# Patient Record
Sex: MALE | NOT HISPANIC OR LATINO | URBAN - METROPOLITAN AREA
[De-identification: names, ages, dates, MRNs, and addresses within clinical notes are randomized per-mention and may not be internally consistent; named-entity substitution may affect disease eponyms.]

---

## 2017-09-30 ENCOUNTER — EMERGENCY (EMERGENCY)
Facility: HOSPITAL | Age: 47
LOS: 1 days | Discharge: HOME | End: 2017-09-30
Attending: EMERGENCY MEDICINE | Admitting: EMERGENCY MEDICINE
Payer: COMMERCIAL

## 2017-09-30 VITALS
TEMPERATURE: 97 F | OXYGEN SATURATION: 100 % | HEART RATE: 100 BPM | DIASTOLIC BLOOD PRESSURE: 77 MMHG | SYSTOLIC BLOOD PRESSURE: 118 MMHG | HEIGHT: 72 IN | WEIGHT: 184.97 LBS | RESPIRATION RATE: 17 BRPM

## 2017-09-30 DIAGNOSIS — M41.9 SCOLIOSIS, UNSPECIFIED: ICD-10-CM

## 2017-09-30 DIAGNOSIS — M54.5 LOW BACK PAIN: ICD-10-CM

## 2017-09-30 DIAGNOSIS — Z90.89 ACQUIRED ABSENCE OF OTHER ORGANS: Chronic | ICD-10-CM

## 2017-09-30 DIAGNOSIS — Z79.1 LONG TERM (CURRENT) USE OF NON-STEROIDAL ANTI-INFLAMMATORIES (NSAID): ICD-10-CM

## 2017-09-30 DIAGNOSIS — M51.36 OTHER INTERVERTEBRAL DISC DEGENERATION, LUMBAR REGION: ICD-10-CM

## 2017-09-30 LAB
APPEARANCE UR: CLEAR — SIGNIFICANT CHANGE UP
BILIRUB UR-MCNC: NEGATIVE — SIGNIFICANT CHANGE UP
COLOR SPEC: YELLOW — SIGNIFICANT CHANGE UP
DIFF PNL FLD: NEGATIVE — SIGNIFICANT CHANGE UP
GLUCOSE UR QL: NEGATIVE — SIGNIFICANT CHANGE UP
KETONES UR-MCNC: NEGATIVE — SIGNIFICANT CHANGE UP
LEUKOCYTE ESTERASE UR-ACNC: NEGATIVE — SIGNIFICANT CHANGE UP
NITRITE UR-MCNC: NEGATIVE — SIGNIFICANT CHANGE UP
PH UR: 7 — SIGNIFICANT CHANGE UP (ref 5–8)
PROT UR-MCNC: NEGATIVE — SIGNIFICANT CHANGE UP
SP GR SPEC: 1.01 — SIGNIFICANT CHANGE UP (ref 1.01–1.02)
UROBILINOGEN FLD QL: NEGATIVE — SIGNIFICANT CHANGE UP

## 2017-09-30 PROCEDURE — 99284 EMERGENCY DEPT VISIT MOD MDM: CPT

## 2017-09-30 PROCEDURE — 72110 X-RAY EXAM L-2 SPINE 4/>VWS: CPT

## 2017-09-30 PROCEDURE — 81003 URINALYSIS AUTO W/O SCOPE: CPT

## 2017-09-30 PROCEDURE — 72110 X-RAY EXAM L-2 SPINE 4/>VWS: CPT | Mod: 26

## 2017-09-30 RX ORDER — CYCLOBENZAPRINE HYDROCHLORIDE 10 MG/1
1 TABLET, FILM COATED ORAL
Qty: 30 | Refills: 0 | OUTPATIENT
Start: 2017-09-30

## 2017-09-30 RX ORDER — CEFDINIR 250 MG/5ML
1 POWDER, FOR SUSPENSION ORAL
Qty: 20 | Refills: 0 | OUTPATIENT
Start: 2017-09-30 | End: 2017-10-10

## 2017-09-30 RX ORDER — DEXAMETHASONE 0.5 MG/5ML
6 ELIXIR ORAL ONCE
Qty: 0 | Refills: 0 | Status: COMPLETED | OUTPATIENT
Start: 2017-09-30 | End: 2017-09-30

## 2017-09-30 RX ORDER — KETOROLAC TROMETHAMINE 30 MG/ML
60 SYRINGE (ML) INJECTION ONCE
Qty: 0 | Refills: 0 | Status: DISCONTINUED | OUTPATIENT
Start: 2017-09-30 | End: 2017-09-30

## 2017-09-30 RX ORDER — DIAZEPAM 5 MG
5 TABLET ORAL ONCE
Qty: 0 | Refills: 0 | Status: DISCONTINUED | OUTPATIENT
Start: 2017-09-30 | End: 2017-09-30

## 2017-09-30 RX ORDER — CYCLOBENZAPRINE HYDROCHLORIDE 10 MG/1
0 TABLET, FILM COATED ORAL
Qty: 0 | Refills: 0 | COMMUNITY

## 2017-09-30 RX ADMIN — Medication 4 MILLIGRAM(S): at 13:13

## 2017-09-30 RX ADMIN — Medication 5 MILLIGRAM(S): at 13:12

## 2017-09-30 RX ADMIN — Medication 60 MILLIGRAM(S): at 13:13

## 2017-09-30 NOTE — ED PROVIDER NOTE - PROGRESS NOTE DETAILS
pt was fast asleep  Reevaluated patient at bedside.  Patient feeling much improved.  Discussed the results of all diagnostic testing in ED and copies of all reports given.   An opportunity to ask questions was given.  Discussed the importance of prompt, close medical follow-up.  Patient will return with any changes, concerns or persistent / worsening symptoms.  Understanding of all instructions verbalized.

## 2017-09-30 NOTE — ED PROVIDER NOTE - MEDICAL DECISION MAKING DETAILS
worsengin low back pain in pt with [place] hnp lumbarspine scolioisis ro acute hnp manage pain ro occult fx given mechanism refer to ortho and pain mgt worsening low back pain in pt with [place] hnp lumbarspine scolioisis ro acute hnp manage pain ro occult fx given mechanism refer to ortho and pain mgt pt cant take too many steroids may worsen a previous eye problem, no rx for medrol

## 2017-09-30 NOTE — ED ADULT NURSE NOTE - OBJECTIVE STATEMENT
patient came in ED with c/o lower back pain worst with sitting or lying down X a week. pt was s/e at Urgent Care, naproxen and flexeril prescribed no relief noted.

## 2017-09-30 NOTE — ED PROVIDER NOTE - CHPI ED SYMPTOMS NEG
no fatigue/no difficulty bearing weight/no constipation/no numbness/no anorexia/no motor function loss/no neck tenderness/no tingling/no bowel dysfunction/no bladder dysfunction

## 2017-09-30 NOTE — ED PROVIDER NOTE - MUSCULOSKELETAL, MLM
Spine appears sciolotic with mod curve lower spine, pain r paraspinal pain pos r slr no motor weakness numbness normal perineum sensation. range of motion is not limited, no muscle or joint tenderness

## 2017-09-30 NOTE — ED PROVIDER NOTE - CONSTITUTIONAL, MLM
normal... Well appearing, well nourished, awake, alert, oriented to person, place, time/situation and in no apparent distress. unless he tries to lay back and has pain

## 2017-09-30 NOTE — ED ADULT NURSE NOTE - CHPI ED SYMPTOMS NEG
no neck tenderness/no bowel dysfunction/no motor function loss/no fatigue/no bladder dysfunction/no difficulty bearing weight/no tingling/no anorexia/no numbness

## 2017-10-04 PROBLEM — Z00.00 ENCOUNTER FOR PREVENTIVE HEALTH EXAMINATION: Status: ACTIVE | Noted: 2017-10-04

## 2017-10-06 ENCOUNTER — APPOINTMENT (OUTPATIENT)
Dept: ORTHOPEDIC SURGERY | Facility: CLINIC | Age: 47
End: 2017-10-06
Payer: COMMERCIAL

## 2017-10-06 VITALS
HEIGHT: 72 IN | HEART RATE: 93 BPM | BODY MASS INDEX: 25.47 KG/M2 | WEIGHT: 188 LBS | DIASTOLIC BLOOD PRESSURE: 68 MMHG | SYSTOLIC BLOOD PRESSURE: 95 MMHG

## 2017-10-06 DIAGNOSIS — M54.5 LOW BACK PAIN: ICD-10-CM

## 2017-10-06 PROCEDURE — 99204 OFFICE O/P NEW MOD 45 MIN: CPT | Mod: 25

## 2017-10-06 PROCEDURE — 20552 NJX 1/MLT TRIGGER POINT 1/2: CPT

## 2021-06-13 ENCOUNTER — TRANSCRIPTION ENCOUNTER (OUTPATIENT)
Age: 51
End: 2021-06-13

## 2021-06-28 NOTE — ED PROVIDER NOTE - CONDUCTED A DETAILED DISCUSSION WITH PATIENT AND/OR GUARDIAN REGARDING, MDM
no need for outpatient follow-up/return to ED if symptoms worsen, persist or questions arise/lab results/radiology results
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
## 2025-01-07 NOTE — ED PROVIDER NOTE - NORMAL, MLM
"I want to go home and be back to myself" "I want to go home and be back to myself" megan all pertinent systems normal